# Patient Record
Sex: FEMALE | RURAL
[De-identification: names, ages, dates, MRNs, and addresses within clinical notes are randomized per-mention and may not be internally consistent; named-entity substitution may affect disease eponyms.]

---

## 2023-01-29 ENCOUNTER — TRANSCRIBE ORDERS (OUTPATIENT)
Dept: CARDIOLOGY | Facility: CLINIC | Age: 73
End: 2023-01-29

## 2023-01-29 DIAGNOSIS — I48.0 PAROXYSMAL ATRIAL FIBRILLATION: Primary | ICD-10-CM

## 2023-05-03 ENCOUNTER — OFFICE VISIT (OUTPATIENT)
Dept: CARDIOLOGY | Facility: CLINIC | Age: 73
End: 2023-05-03
Payer: MEDICARE

## 2023-05-03 VITALS
HEART RATE: 63 BPM | DIASTOLIC BLOOD PRESSURE: 66 MMHG | WEIGHT: 117 LBS | HEIGHT: 61 IN | OXYGEN SATURATION: 98 % | BODY MASS INDEX: 22.09 KG/M2 | SYSTOLIC BLOOD PRESSURE: 132 MMHG

## 2023-05-03 DIAGNOSIS — I48.0 PAROXYSMAL ATRIAL FIBRILLATION: ICD-10-CM

## 2023-05-03 DIAGNOSIS — I10 HYPERTENSION, ESSENTIAL: Primary | ICD-10-CM

## 2023-05-03 DIAGNOSIS — Z95.0 PACEMAKER: ICD-10-CM

## 2023-05-03 RX ORDER — PREDNISONE 20 MG/1
TABLET ORAL
COMMUNITY
Start: 2023-03-27

## 2023-05-03 RX ORDER — HYDROXYZINE HYDROCHLORIDE 10 MG/1
TABLET, FILM COATED ORAL
COMMUNITY

## 2023-05-03 RX ORDER — LISINOPRIL 20 MG/1
TABLET ORAL
COMMUNITY
Start: 2023-04-06

## 2023-05-03 RX ORDER — OMEPRAZOLE 20 MG/1
CAPSULE, DELAYED RELEASE ORAL
COMMUNITY
Start: 2023-03-03

## 2023-05-03 RX ORDER — UREA 10 %
LOTION (ML) TOPICAL
COMMUNITY

## 2023-05-03 RX ORDER — IBUPROFEN 600 MG/1
TABLET ORAL
COMMUNITY
Start: 2023-02-27

## 2023-05-03 RX ORDER — FERROUS SULFATE 325(65) MG
TABLET ORAL
COMMUNITY

## 2023-05-03 RX ORDER — ALENDRONATE SODIUM 70 MG/1
TABLET ORAL
COMMUNITY
Start: 2023-02-22

## 2023-05-03 RX ORDER — LEVOTHYROXINE SODIUM 0.07 MG/1
TABLET ORAL
COMMUNITY
Start: 2023-04-06

## 2023-05-03 RX ORDER — OXYBUTYNIN CHLORIDE 5 MG/1
TABLET ORAL
COMMUNITY
Start: 2023-04-06

## 2023-05-03 RX ORDER — ATORVASTATIN CALCIUM 40 MG/1
TABLET, FILM COATED ORAL
COMMUNITY
Start: 2023-04-06

## 2023-05-03 RX ORDER — RIVAROXABAN 10 MG/1
TABLET, FILM COATED ORAL
COMMUNITY
Start: 2023-04-06

## 2023-05-03 RX ORDER — LORAZEPAM 0.5 MG/1
TABLET ORAL
COMMUNITY
Start: 2023-03-07

## 2023-05-03 NOTE — PROGRESS NOTES
[facility] Provider Note     Date:   2023   Name: Kathie Yeboah  :   1950  PCP: Provider, No Known    Chief Complaint   Patient presents with   • Follow-up   • Pacemaker Check       Subjective     History of Present Illness  Kathie Yeboah is a 72 y.o. female who presents today for follow-up.  She reports doing well.  She has had a second hip fracture recently.  She just got out of rehab facility.  She denies any chest pain or shortness of breath.  She denies any worsening lower extremity edema.  She is ambulating with a walker.    Cardiology/Sleep History  1. Paroxysmal A fib on xarelto  2. SSS s/p DC PM  3. Nuclear stress 4. - low risk study      Allergies   Allergen Reactions   • Mushroom Itching       Current Outpatient Medications:   •  alendronate (FOSAMAX) 70 MG tablet, , Disp: , Rfl:   •  atorvastatin (LIPITOR) 40 MG tablet, , Disp: , Rfl:   •  calcium carbonate (OS-ARI) 1250 (500 Ca) MG chewable tablet, calcium carbonate 500 mg calcium (1,250 mg) chewable tablet  1 tablet PO q day, Disp: , Rfl:   •  Ergocalciferol 10 MCG (400 UNIT) tablet, ergocalciferol (vitamin D2) 10 mcg (400 unit) tablet, Disp: , Rfl:   •  ferrous sulfate 325 (65 FE) MG tablet, FeroSul 325 mg (65 mg iron) tablet, Disp: , Rfl:   •  hydrOXYzine (ATARAX) 10 MG tablet, hydroxyzine HCl 10 mg tablet, Disp: , Rfl:   •  ibuprofen (ADVIL,MOTRIN) 600 MG tablet, , Disp: , Rfl:   •  levothyroxine (SYNTHROID, LEVOTHROID) 75 MCG tablet, , Disp: , Rfl:   •  lisinopril (PRINIVIL,ZESTRIL) 20 MG tablet, , Disp: , Rfl:   •  LORazepam (ATIVAN) 0.5 MG tablet, , Disp: , Rfl:   •  metFORMIN (GLUCOPHAGE) 500 MG tablet, , Disp: , Rfl:   •  metoprolol tartrate (LOPRESSOR) 25 MG tablet, , Disp: , Rfl:   •  omeprazole (priLOSEC) 20 MG capsule, , Disp: , Rfl:   •  oxybutynin (DITROPAN) 5 MG tablet, , Disp: , Rfl:   •  predniSONE (DELTASONE) 20 MG tablet, , Disp: , Rfl:   •  Xarelto 10 MG tablet, , Disp: , Rfl:     Past  "Medical History:   Diagnosis Date   • Bradycardia, unspecified    • CAD (coronary artery disease)    • Cervical cancer 2018   • Essential (primary) hypertension    • Hip fracture    • Hypothyroidism    • NSTEMI (non-ST elevated myocardial infarction) 04/2021    AFTER HIP; MILD NO CATH   • PAF (paroxysmal atrial fibrillation)    • Presence of cardiac pacemaker    • Type 2 diabetes mellitus       Past Surgical History:   Procedure Laterality Date   • OTHER SURGICAL HISTORY  04/2021    LOOP RECORDER   • TUBAL ABDOMINAL LIGATION       Family History   Problem Relation Age of Onset   • Lung cancer Mother    • Diabetes Mother    • Liver cancer Father         ALCOHOLISM   • Diabetes Sister    • Other Brother         CAR WRECK   • Other Brother         GUNSHOT     Social History     Socioeconomic History   • Marital status: Single   • Number of children: 3   Tobacco Use   • Smoking status: Never   Substance and Sexual Activity   • Alcohol use: Not Currently   • Drug use: Not Currently       Objective     Vital Signs:  /66 (BP Location: Left arm)   Pulse 63   Ht 154.9 cm (61\")   Wt 53.1 kg (117 lb)   SpO2 98%   BMI 22.11 kg/m²   Estimated body mass index is 22.11 kg/m² as calculated from the following:    Height as of this encounter: 154.9 cm (61\").    Weight as of this encounter: 53.1 kg (117 lb).       BMI is within normal parameters. No other follow-up for BMI required.      Physical Exam  Constitutional:       Appearance: Normal appearance. She is well-developed.   HENT:      Head: Normocephalic and atraumatic.   Eyes:      General: No scleral icterus.     Pupils: Pupils are equal, round, and reactive to light.   Neck:      Vascular: No carotid bruit.   Cardiovascular:      Rate and Rhythm: Normal rate and regular rhythm.      Pulses: Normal pulses.           Radial pulses are 2+ on the right side and 2+ on the left side.        Dorsalis pedis pulses are 2+ on the right side and 2+ on the left side.        " Posterior tibial pulses are 2+ on the right side and 2+ on the left side.      Heart sounds: Normal heart sounds. No murmur heard.  Pulmonary:      Breath sounds: Normal breath sounds. No wheezing or rhonchi.   Musculoskeletal:      Right lower leg: No edema.      Left lower leg: No edema.   Skin:     Capillary Refill: Capillary refill takes less than 2 seconds.      Coloration: Skin is not cyanotic.      Nails: There is no clubbing.   Neurological:      Mental Status: She is alert and oriented to person, place, and time.      Motor: No weakness.      Gait: Gait abnormal (Ambulating with walker).   Psychiatric:         Mood and Affect: Mood normal.         Behavior: Behavior is cooperative.         Thought Content: Thought content normal.         Cognition and Memory: Memory normal.           Implantable cardiac device download reviewed: Good battery life and lead function          Assessment and Plan     Diagnoses and all orders for this visit:    1. Hypertension, essential (Primary)  Comments:  Controlled.  Continue current medications.  She has not had an echocardiogram to assess LV function.  We will update at next appointment.  Orders:  -     Adult Transthoracic Echo Complete W/ Cont if Necessary Per Protocol; Future    2. Pacemaker  Comments:  Good battery life and lead function.  Orders:  -     Adult Transthoracic Echo Complete W/ Cont if Necessary Per Protocol; Future    3. Paroxysmal atrial fibrillation  Comments:  I do not see an echocardiogram in our old system.  On anticoagulation.  Update echo at next appointment.  Currently asymptomatic.  Orders:  -     Adult Transthoracic Echo Complete W/ Cont if Necessary Per Protocol; Future        Recommendations: ER if symptoms increase and Report if any new/changing symptoms immediately          Follow Up  Return in about 6 months (around 11/3/2023) for PM/ICD check with echo at this appt.  Patient was given instructions and counseling regarding her condition or  for health maintenance advice. Please see specific information pulled into the AVS if appropriate.

## 2023-07-24 ENCOUNTER — TELEPHONE (OUTPATIENT)
Dept: CARDIOLOGY | Facility: CLINIC | Age: 73
End: 2023-07-24
Payer: MEDICARE

## 2023-07-24 NOTE — TELEPHONE ENCOUNTER
She is not pacemaker dependent.  She is on Xarelto and it is okay to hold that for 2 days prior to procedure.  HUB okay to read.

## 2023-07-24 NOTE — TELEPHONE ENCOUNTER
[facility] in [place] called needing to know if Mrs. Yeboah is pacemaker dependent? Would it be okay to do a bilateral lower extremity nerve conduction? Is patient on blood thinners? If so, they would need approval for patient to come off of them before procedure. Phone number is [phone] if there are any questions.

## 2023-12-06 ENCOUNTER — TELEPHONE (OUTPATIENT)
Dept: CARDIOLOGY | Facility: CLINIC | Age: 73
End: 2023-12-06
Payer: MEDICARE

## 2023-12-06 ENCOUNTER — OFFICE VISIT (OUTPATIENT)
Dept: CARDIOLOGY | Facility: CLINIC | Age: 73
End: 2023-12-06
Payer: MEDICARE

## 2023-12-06 VITALS
HEIGHT: 61 IN | OXYGEN SATURATION: 99 % | WEIGHT: 121 LBS | DIASTOLIC BLOOD PRESSURE: 68 MMHG | HEART RATE: 62 BPM | BODY MASS INDEX: 22.84 KG/M2 | SYSTOLIC BLOOD PRESSURE: 136 MMHG

## 2023-12-06 DIAGNOSIS — I48.0 PAF (PAROXYSMAL ATRIAL FIBRILLATION): ICD-10-CM

## 2023-12-06 DIAGNOSIS — Z95.0 PRESENCE OF CARDIAC PACEMAKER: ICD-10-CM

## 2023-12-06 DIAGNOSIS — E78.2 MIXED HYPERLIPIDEMIA: ICD-10-CM

## 2023-12-06 DIAGNOSIS — I10 ESSENTIAL (PRIMARY) HYPERTENSION: ICD-10-CM

## 2023-12-06 PROBLEM — M19.90 IDIOPATHIC OSTEOARTHRITIS: Status: ACTIVE | Noted: 2017-11-10

## 2023-12-06 PROBLEM — Z87.81 HISTORY OF RIB FRACTURE: Status: ACTIVE | Noted: 2023-12-06

## 2023-12-06 PROBLEM — S72.009A HIP FRACTURE: Status: ACTIVE | Noted: 2023-12-06

## 2023-12-06 PROBLEM — E03.9 ACQUIRED HYPOTHYROIDISM: Status: ACTIVE | Noted: 2017-11-10

## 2023-12-06 PROBLEM — Z96.649 PRESENCE OF HIP JOINT PROSTHESIS: Status: ACTIVE | Noted: 2021-04-29

## 2023-12-06 PROBLEM — I25.10 ATHSCL HEART DISEASE OF NATIVE CORONARY ARTERY W/O ANG PCTRS: Status: ACTIVE | Noted: 2022-10-30

## 2023-12-06 PROBLEM — M81.0 OSTEOPOROSIS: Status: ACTIVE | Noted: 2021-10-07

## 2023-12-06 PROBLEM — E11.9 DIABETES MELLITUS: Status: ACTIVE | Noted: 2023-12-06

## 2023-12-06 PROBLEM — R41.81 AGE-RELATED COGNITIVE DECLINE: Status: ACTIVE | Noted: 2019-05-10

## 2023-12-06 PROBLEM — S72.009A FRACTURE OF NECK OF FEMUR: Status: ACTIVE | Noted: 2023-12-06

## 2023-12-06 PROBLEM — D50.9 IRON DEFICIENCY ANEMIA: Status: ACTIVE | Noted: 2020-05-07

## 2023-12-06 PROBLEM — I49.5 SICK SINUS SYNDROME: Status: ACTIVE | Noted: 2023-02-01

## 2023-12-06 PROBLEM — Z91.81 HISTORY OF FALLING: Status: ACTIVE | Noted: 2022-01-01

## 2023-12-06 PROBLEM — R07.9 CHEST PAIN: Status: ACTIVE | Noted: 2023-12-06

## 2023-12-06 PROBLEM — K21.9 GASTROESOPHAGEAL REFLUX DISEASE WITHOUT ESOPHAGITIS: Status: ACTIVE | Noted: 2017-11-10

## 2023-12-06 PROBLEM — I21.4 NSTEMI (NON-ST ELEVATED MYOCARDIAL INFARCTION): Status: ACTIVE | Noted: 2021-04-01

## 2023-12-06 PROBLEM — Z79.01 CHRONIC ANTICOAGULATION: Status: ACTIVE | Noted: 2023-12-06

## 2023-12-06 PROBLEM — M47.816 LUMBAR SPONDYLOSIS: Status: ACTIVE | Noted: 2019-04-08

## 2023-12-06 PROBLEM — E11.9 TYPE 2 DIABETES MELLITUS WITHOUT COMPLICATION: Status: ACTIVE | Noted: 2020-05-07

## 2023-12-06 PROBLEM — C53.0 MALIGNANT NEOPLASM OF ENDOCERVIX: Status: ACTIVE | Noted: 2018-05-15

## 2023-12-06 PROBLEM — M81.0 AGE-RELATED OSTEOPOROSIS WITHOUT CURRENT PATHOLOGICAL FRACTURE: Status: ACTIVE | Noted: 2021-01-01

## 2023-12-06 PROBLEM — D64.9 ANEMIA: Status: ACTIVE | Noted: 2019-10-22

## 2023-12-06 PROBLEM — R00.1 SINUS BRADYCARDIA: Status: ACTIVE | Noted: 2023-12-06

## 2023-12-06 PROBLEM — Z01.810 PREOP CARDIOVASCULAR EXAM: Status: ACTIVE | Noted: 2023-12-06

## 2023-12-06 RX ORDER — POTASSIUM CHLORIDE 20 MEQ/1
TABLET, EXTENDED RELEASE ORAL
COMMUNITY
Start: 2023-09-28

## 2023-12-06 RX ORDER — LEVOTHYROXINE SODIUM 88 UG/1
88 TABLET ORAL DAILY
COMMUNITY
Start: 2023-09-28

## 2023-12-06 RX ORDER — RIVAROXABAN 10 MG/1
10 TABLET, FILM COATED ORAL DAILY
Qty: 90 TABLET | Refills: 1 | Status: SHIPPED | OUTPATIENT
Start: 2023-12-06

## 2023-12-06 RX ORDER — CETIRIZINE HYDROCHLORIDE 10 MG/1
1 TABLET ORAL DAILY
COMMUNITY
Start: 2023-09-26

## 2023-12-06 NOTE — PROGRESS NOTES
[facility] Provider Note     Date:   2023  Name: Kathie Yeboah  :   1950  PCP: Nayely Roland    Chief Complaint   Patient presents with    Pacemaker Check    Follow-up       Subjective     History of Present Illness  Kathie Yeboah is a 73 y.o. female who presents today for follow-up with pacemaker check.  She reports doing well.  She denies any chest pain or shortness of breath.  She denies any worsening lower extremity edema.  She is ambulating with a walker.  She feels like she is still healing from second hip surgery.  A-fib is rate controlled and asymptomatic.    Last visit Dr. De Leon wanted to update her echo.  Preliminary results discussed with patient today.    Patient is not pacemaker dependent.  Pacemaker interrogated and downloaded in clinic today showing good leads in good battery life with no events.    Cardiology/Sleep History  1. Paroxysmal A fib on xarelto  2. SSS s/p DC PM  3. Nuclear stress 4. - low risk study    2023 echo-preliminary EF 51 to 55%, hypokinetic mid inferior left ventricular wall segments, grade 1 diastolic dysfunction, left atrial cavity mildly dilated.      Allergies   Allergen Reactions    Acetaminophen Nausea And Vomiting    Mushroom Itching       Current Outpatient Medications:     alendronate (FOSAMAX) 70 MG tablet, , Disp: , Rfl:     atorvastatin (LIPITOR) 40 MG tablet, , Disp: , Rfl:     calcium carbonate (OS-ARI) 1250 (500 Ca) MG chewable tablet, calcium carbonate 500 mg calcium (1,250 mg) chewable tablet  1 tablet PO q day, Disp: , Rfl:     cetirizine (zyrTEC) 10 MG tablet, Take 1 tablet by mouth Daily., Disp: , Rfl:     diphenhydrAMINE (BENADRYL CHILDRENS ALLERGY) 12.5 MG/5ML liquid, 10 mL., Disp: , Rfl:     Ergocalciferol 10 MCG (400 UNIT) tablet, ergocalciferol (vitamin D2) 10 mcg (400 unit) tablet, Disp: , Rfl:     ferrous sulfate 325 (65 FE) MG tablet, FeroSul 325 mg (65 mg iron) tablet, Disp: , Rfl:     hydrOXYzine  (ATARAX) 10 MG tablet, hydroxyzine HCl 10 mg tablet, Disp: , Rfl:     ibuprofen (ADVIL,MOTRIN) 600 MG tablet, , Disp: , Rfl:     levothyroxine (SYNTHROID, LEVOTHROID) 88 MCG tablet, Take 1 tablet by mouth Daily., Disp: , Rfl:     lisinopril (PRINIVIL,ZESTRIL) 20 MG tablet, , Disp: , Rfl:     LORazepam (ATIVAN) 0.5 MG tablet, , Disp: , Rfl:     metFORMIN (GLUCOPHAGE) 500 MG tablet, , Disp: , Rfl:     metoprolol tartrate (LOPRESSOR) 25 MG tablet, , Disp: , Rfl:     omeprazole (priLOSEC) 20 MG capsule, , Disp: , Rfl:     oxybutynin (DITROPAN) 5 MG tablet, , Disp: , Rfl:     potassium chloride (K-DUR,KLOR-CON) 20 MEQ CR tablet, Take 1 tablet every day by oral route for 2 days., Disp: , Rfl:     predniSONE (DELTASONE) 20 MG tablet, , Disp: , Rfl:     Xarelto 10 MG tablet, Take 1 tablet by mouth Daily., Disp: 90 tablet, Rfl: 1    Past Medical History:   Diagnosis Date    Cervical cancer 2018    Essential (primary) hypertension     Hip fracture     Hypothyroidism     NSTEMI (non-ST elevated myocardial infarction) 04/2021    AFTER HIP; MILD NO CATH    PAF (paroxysmal atrial fibrillation) 12/06/2023    Presence of cardiac pacemaker       Past Surgical History:   Procedure Laterality Date    OTHER SURGICAL HISTORY  04/2021    LOOP RECORDER    TUBAL ABDOMINAL LIGATION       Family History   Problem Relation Age of Onset    Lung cancer Mother     Diabetes Mother     Liver cancer Father         ALCOHOLISM    Diabetes Sister     Other Brother         CAR WRECK    Other Brother         GUNSHOT     Social History     Socioeconomic History    Marital status: Single    Number of children: 3   Tobacco Use    Smoking status: Never     Passive exposure: Never    Smokeless tobacco: Never   Vaping Use    Vaping Use: Never used   Substance and Sexual Activity    Alcohol use: Not Currently    Drug use: Not Currently    Sexual activity: Not Currently       Objective     Vital Signs:  /68 (BP Location: Left arm)   Pulse 62   Ht 154.9  "cm (61\")   Wt 54.9 kg (121 lb)   SpO2 99%   BMI 22.86 kg/m²   Estimated body mass index is 22.86 kg/m² as calculated from the following:    Height as of this encounter: 154.9 cm (61\").    Weight as of this encounter: 54.9 kg (121 lb).       BMI is within normal parameters. No other follow-up for BMI required.      Physical Exam  Constitutional:       Appearance: Normal appearance. She is well-developed.   HENT:      Head: Normocephalic and atraumatic.   Eyes:      General: No scleral icterus.     Pupils: Pupils are equal, round, and reactive to light.   Neck:      Vascular: No carotid bruit.   Cardiovascular:      Rate and Rhythm: Normal rate and regular rhythm.      Pulses: Normal pulses.           Radial pulses are 2+ on the right side and 2+ on the left side.        Dorsalis pedis pulses are 2+ on the right side and 2+ on the left side.        Posterior tibial pulses are 2+ on the right side and 2+ on the left side.      Heart sounds: Normal heart sounds. No murmur heard.  Pulmonary:      Breath sounds: Normal breath sounds. No wheezing or rhonchi.   Musculoskeletal:      Right lower leg: No edema.      Left lower leg: No edema.   Skin:     Capillary Refill: Capillary refill takes less than 2 seconds.      Coloration: Skin is not cyanotic.      Nails: There is no clubbing.   Neurological:      Mental Status: She is alert and oriented to person, place, and time.      Motor: No weakness.      Gait: Gait abnormal (Ambulating with walker).   Psychiatric:         Mood and Affect: Mood normal.         Behavior: Behavior is cooperative.         Thought Content: Thought content normal.         Cognition and Memory: Memory normal.           Implantable cardiac device download reviewed: Good battery life and lead function          Assessment and Plan     Diagnoses and all orders for this visit:    1. Presence of cardiac pacemaker  Comments:  Interrogated in clinic today.  Good battery good leads.    2. PAF (paroxysmal " atrial fibrillation)  Comments:  On medical therapy.  Rate controlled.  Echo updated today.    3. Mixed hyperlipidemia  Comments:  On statin.  Need recent labs from PCP.    4. Essential (primary) hypertension  Comments:  At goal of less than 140/9090.  Continue plan of care.    Other orders  -     Xarelto 10 MG tablet; Take 1 tablet by mouth Daily.  Dispense: 90 tablet; Refill: 1          Recommendations: ER if symptoms increase and Report if any new/changing symptoms immediately          Follow Up  Return in about 6 months (around 6/6/2024) for with PM check.  Patient was given instructions and counseling regarding her condition or for health maintenance advice. Please see specific information pulled into the AVS if appropriate.

## 2023-12-06 NOTE — TELEPHONE ENCOUNTER
----- Message from SHARI Hameed sent at 12/6/2023  2:32 PM EST -----  Regarding: Sleep study  Please check QRS for a sleep study or for patient declining sleep study as she has A-fib.  Thanks.

## 2023-12-07 NOTE — TELEPHONE ENCOUNTER
Spoke to Dominique DEAL) and she states she has received the request and will complete it on 12/8/2023.

## 2023-12-08 DIAGNOSIS — I48.0 PAF (PAROXYSMAL ATRIAL FIBRILLATION): Primary | ICD-10-CM

## 2023-12-08 NOTE — PROGRESS NOTES
Staff called patient and she is willing to do a HST since she has afib.  However, when I go to put the order in I get a medicare warning that afib is not a covered diagnosis.  Will discuss hypersomnia s/sx and get an updated [place] score next visit and go from there.

## 2024-06-19 ENCOUNTER — TELEPHONE (OUTPATIENT)
Dept: CARDIOLOGY | Facility: CLINIC | Age: 74
End: 2024-06-19
Payer: MEDICARE

## 2024-06-19 NOTE — TELEPHONE ENCOUNTER
Multiple missed remote transmissions. Missed last in-office visit. Listed phone number has been disconnected. Will mail letter.

## 2025-04-16 ENCOUNTER — TELEPHONE (OUTPATIENT)
Dept: CARDIOLOGY | Facility: CLINIC | Age: 75
End: 2025-04-16
Payer: MEDICARE

## 2025-04-16 NOTE — TELEPHONE ENCOUNTER
PATIENT HAS NOT BEEN SEEN SINCE 2023, PATIENT WILL NEED PM CHECK ALSO. PATIENT WILL NEED AN APPOINTMENT.

## 2025-04-16 NOTE — TELEPHONE ENCOUNTER
Nursing home will be calling to schedule Pt.  OK for hub to schedule or transfer to office please for scheduling.  Nursing home  is [facility].

## 2025-04-16 NOTE — TELEPHONE ENCOUNTER
Fax request for cardiac clearance from [facility]/Dr John Dorado.  PT is scheduled for EGD/Colonoscopy on 5/1/25.   P: [phone]  F: [phone]

## 2025-04-16 NOTE — TELEPHONE ENCOUNTER
Unable to reach PT at phone number in her chart.  Reached out to [facility] for more current phone number.  They stated PT is currently in nursing home in [place]  They gave me phone number [phone] for nursing home and Dario Germain [phone] as her person to contact if needed.    Will reach out to nursing home to try and schedule appt.